# Patient Record
Sex: MALE | Race: ASIAN | Employment: OTHER | URBAN - METROPOLITAN AREA
[De-identification: names, ages, dates, MRNs, and addresses within clinical notes are randomized per-mention and may not be internally consistent; named-entity substitution may affect disease eponyms.]

---

## 2017-01-25 NOTE — TELEPHONE ENCOUNTER
Diabetic Medication Protocol Failed1/25 7:14 AM   HgBA1C procedure resulted in past 6 months    Last HgBA1C < 7.5    Appointment in past 6 or next 3 months     Cholesterol Medication Protocol Failed1/25 7:14 AM   Lipid panel within past 6 months     [place]

## 2017-01-25 NOTE — TELEPHONE ENCOUNTER
From: Samson Dawson  To:  Dalton Shah MD  Sent: 1/25/2017 7:00 AM CST  Subject: Medication Renewal Request    Original authorizing provider: MD Lisbeth Tomlinson would like a refill of the following medications:  MetFORMIN

## 2017-01-31 NOTE — TELEPHONE ENCOUNTER
LMOVM to inform pt that it's very important he call and schedule a medication f/up for his refills. 3rd outreach please close encounter, pending medications.

## 2017-02-01 RX ORDER — LOSARTAN POTASSIUM 100 MG/1
TABLET ORAL
Qty: 30 TABLET | Refills: 0 | Status: SHIPPED | OUTPATIENT
Start: 2017-02-01

## 2017-02-01 RX ORDER — CLOPIDOGREL BISULFATE 75 MG/1
TABLET ORAL
Qty: 30 TABLET | Refills: 0 | Status: SHIPPED | OUTPATIENT
Start: 2017-02-01

## 2017-02-01 RX ORDER — CARVEDILOL 25 MG/1
TABLET ORAL
Qty: 60 TABLET | Refills: 0 | Status: SHIPPED | OUTPATIENT
Start: 2017-02-01

## 2017-02-01 RX ORDER — SPIRONOLACTONE 25 MG/1
TABLET ORAL
Qty: 30 TABLET | Refills: 0 | Status: SHIPPED | OUTPATIENT
Start: 2017-02-01

## 2017-02-01 RX ORDER — AMLODIPINE BESYLATE 5 MG/1
5 TABLET ORAL
Qty: 30 TABLET | Refills: 0 | Status: SHIPPED | OUTPATIENT
Start: 2017-02-01

## 2017-02-01 RX ORDER — FENOFIBRATE 130 MG/1
CAPSULE ORAL
Qty: 30 CAPSULE | Refills: 0 | Status: SHIPPED | OUTPATIENT
Start: 2017-02-01

## 2017-02-01 RX ORDER — METFORMIN HYDROCHLORIDE EXTENDED-RELEASE TABLETS 1000 MG/1
1000 TABLET, FILM COATED, EXTENDED RELEASE ORAL 2 TIMES DAILY WITH MEALS
Qty: 30 TABLET | Refills: 0 | Status: SHIPPED | OUTPATIENT
Start: 2017-02-01

## 2017-02-01 RX ORDER — ATORVASTATIN CALCIUM 40 MG/1
TABLET, FILM COATED ORAL
Qty: 30 TABLET | Refills: 0 | Status: SHIPPED | OUTPATIENT
Start: 2017-02-01

## 2017-02-17 ENCOUNTER — PATIENT MESSAGE (OUTPATIENT)
Dept: FAMILY MEDICINE CLINIC | Facility: CLINIC | Age: 47
End: 2017-02-17

## 2017-02-17 RX ORDER — METFORMIN HYDROCHLORIDE EXTENDED-RELEASE TABLETS 1000 MG/1
1000 TABLET, FILM COATED, EXTENDED RELEASE ORAL 2 TIMES DAILY WITH MEALS
Qty: 30 TABLET | Refills: 0 | Status: CANCELLED | OUTPATIENT
Start: 2017-02-17

## 2017-02-17 NOTE — TELEPHONE ENCOUNTER
From: Rosmery Dawson  To: William Montana MD  Sent: 2/17/2017 10:34 AM CST  Subject: Prescription Question    I just noticed that the metformin medicine was only a qty of 30. My prescription is for 2 times per day.  Would it be possible to request ano

## 2017-02-17 NOTE — TELEPHONE ENCOUNTER
Patient is requesting an additional prescription for his Metformin. Previous RX is only for 30q and patient sig indicates to take twice a day. Per last refill request patient is to schedule an appt and have labs drawn for further refills.  Asked patient to

## 2017-02-24 ENCOUNTER — TELEPHONE (OUTPATIENT)
Dept: FAMILY MEDICINE CLINIC | Facility: CLINIC | Age: 47
End: 2017-02-24

## 2017-06-07 ENCOUNTER — TELEPHONE (OUTPATIENT)
Dept: FAMILY MEDICINE CLINIC | Facility: CLINIC | Age: 47
End: 2017-06-07

## 2017-06-19 NOTE — TELEPHONE ENCOUNTER
Pt lov 5/2016 - With Dr. Aggarwal Check.  LMOM asking if he still intends to be a patient of Dr. Gunjan Moreno.

## 2021-03-17 DIAGNOSIS — Z23 NEED FOR VACCINATION: ICD-10-CM

## 2023-01-26 ENCOUNTER — TELEPHONE (OUTPATIENT)
Dept: FAMILY MEDICINE CLINIC | Facility: CLINIC | Age: 53
End: 2023-01-26

## 2023-02-08 ENCOUNTER — TELEPHONE (OUTPATIENT)
Dept: FAMILY MEDICINE CLINIC | Facility: CLINIC | Age: 53
End: 2023-02-08

## 2023-02-08 NOTE — TELEPHONE ENCOUNTER
Documentation that patient moved out of [place] and is no longer a patient of . PCP removal request has been ordered.

## 2023-03-23 ENCOUNTER — PATIENT OUTREACH (OUTPATIENT)
Dept: CASE MANAGEMENT | Age: 53
End: 2023-03-23

## 2023-03-23 NOTE — PROCEDURES
The office order for PCP removal request is Approved and finalized on March 23, 2023. [facility] Patient assigned to Dr. Savanah Roth. Updated PCP field to assigned PCP.      Thanks,  [facility] [employer]

## 2024-11-06 ENCOUNTER — TELEPHONE (OUTPATIENT)
Dept: FAMILY MEDICINE CLINIC | Facility: CLINIC | Age: 54
End: 2024-11-06

## (undated) NOTE — LETTER
11/07/24 November 7, 2024      Samson Dawson  [id number] [address] [id number]      Dear Samson:      Your health insurance company has notified us that you have chosen or been assigned to Dr. Maria Victoria Gamboa as your Primary Care Physician (PCP). We have not had the pleasure of establishing care with you as of yet.    If you have established care with a different provider and feel that Dr. Maria Victoria Gamboa is not the provider that you have chosen then you must contact your insurance company to remove Dr. Maria Victoria Gamboa as the PCP. Please provide your insurance company with the name of the current PCP that you have established care with. This way the insurance company will update their records to have the correct provider name and info on file and eliminate any future outreach calls and/or correspondence letters from our office.     Due to HIPAA and insurance requirements, [facility] employees are unable to contact your insurance company on your behalf to change your PCP. This PCP change must be done by the policy tijer[place] of the insurance.   Please give our office a call at your earliest convenience, so that we can help you become established with us.  You may call the office at [phone] to speak with the .     Thank you,    The office of Dr. Maria Victoria Gamboa